# Patient Record
Sex: FEMALE | Race: OTHER | HISPANIC OR LATINO | URBAN - METROPOLITAN AREA
[De-identification: names, ages, dates, MRNs, and addresses within clinical notes are randomized per-mention and may not be internally consistent; named-entity substitution may affect disease eponyms.]

---

## 2018-10-19 ENCOUNTER — EMERGENCY (EMERGENCY)
Facility: HOSPITAL | Age: 68
LOS: 1 days | Discharge: HOME | End: 2018-10-19
Attending: EMERGENCY MEDICINE
Payer: MEDICAID

## 2018-10-19 VITALS
DIASTOLIC BLOOD PRESSURE: 78 MMHG | RESPIRATION RATE: 16 BRPM | WEIGHT: 110.01 LBS | OXYGEN SATURATION: 99 % | HEART RATE: 88 BPM | HEIGHT: 61 IN | SYSTOLIC BLOOD PRESSURE: 165 MMHG | TEMPERATURE: 99 F

## 2018-10-19 PROCEDURE — 99284 EMERGENCY DEPT VISIT MOD MDM: CPT | Mod: 25

## 2018-10-19 PROCEDURE — 12001 RPR S/N/AX/GEN/TRNK 2.5CM/<: CPT

## 2018-10-19 PROCEDURE — 90715 TDAP VACCINE 7 YRS/> IM: CPT

## 2018-10-19 PROCEDURE — 90471 IMMUNIZATION ADMIN: CPT

## 2018-10-19 PROCEDURE — 99283 EMERGENCY DEPT VISIT LOW MDM: CPT

## 2018-10-19 RX ORDER — IBUPROFEN 200 MG
400 TABLET ORAL ONCE
Qty: 0 | Refills: 0 | Status: COMPLETED | OUTPATIENT
Start: 2018-10-19 | End: 2018-10-19

## 2018-10-19 RX ORDER — TETANUS TOXOID, REDUCED DIPHTHERIA TOXOID AND ACELLULAR PERTUSSIS VACCINE, ADSORBED 5; 2.5; 8; 8; 2.5 [IU]/.5ML; [IU]/.5ML; UG/.5ML; UG/.5ML; UG/.5ML
0.5 SUSPENSION INTRAMUSCULAR ONCE
Qty: 0 | Refills: 0 | Status: COMPLETED | OUTPATIENT
Start: 2018-10-19 | End: 2018-10-19

## 2018-10-19 RX ORDER — ACETAMINOPHEN 500 MG
650 TABLET ORAL ONCE
Qty: 0 | Refills: 0 | Status: COMPLETED | OUTPATIENT
Start: 2018-10-19 | End: 2018-10-19

## 2018-10-19 RX ORDER — CEPHALEXIN 500 MG
1 CAPSULE ORAL
Qty: 14 | Refills: 0 | OUTPATIENT
Start: 2018-10-19 | End: 2018-10-25

## 2018-10-19 RX ADMIN — TETANUS TOXOID, REDUCED DIPHTHERIA TOXOID AND ACELLULAR PERTUSSIS VACCINE, ADSORBED 0.5 MILLILITER(S): 5; 2.5; 8; 8; 2.5 SUSPENSION INTRAMUSCULAR at 13:28

## 2018-10-19 NOTE — ED PROVIDER NOTE - PROGRESS NOTE DETAILS
Spoke with Dr. Arredondo (hand) will suture laceration, and have patient see Dr. Arredondo in the office next week. for follow up and schedule of surgery. Patient sutured by ortho, will follow up with Dr. Arredondo, Pt is well appearing walking with steady gait, stable for discharge and follow up without fail with medical doctor. I had a detailed discussion with the patient and/or guardian regarding the historical points, exam findings, and any diagnostic results supporting the discharge diagnosis. Pt educated on care and need for follow up. Strict return instructions and red flag signs and symptoms discussed with patient. Questions answered. Pt shows understanding of discharge information and agrees to follow.

## 2018-10-19 NOTE — ED ADULT NURSE NOTE - OBJECTIVE STATEMENT
States she cut her right hand with a knife today. right 5th finger with laceration, ,unable to move right 5th finger. States she cut her right hand with a knife today. right 5th finger with laceration, ,unable to move right 5th finger.Right 5th finger laceration sutures by orthopedic PA.Refused repeat VS.

## 2018-10-19 NOTE — ED PROVIDER NOTE - PRINCIPAL DIAGNOSIS
Laceration of little finger without foreign body with damage to nail, unspecified laterality, initial encounter

## 2018-10-19 NOTE — ED PROVIDER NOTE - CARE PLAN
Principal Discharge DX:	Laceration of little finger without foreign body with damage to nail, unspecified laterality, initial encounter

## 2018-10-19 NOTE — ED PROVIDER NOTE - OBJECTIVE STATEMENT
66 y/o F patient with a significant PMHx of Arthritis and no significant PSHx presents to the ED with a right fifth digit laceration. Patient denies any other complaints. NKDA. 66 y/o F patient with a significant PMHx of Arthritis and no significant PSHx presents to the ED with a right fifth digit laceration. Patient denies any other complaints.  NKDA.

## 2018-10-19 NOTE — ED PROVIDER NOTE - CONSTITUTIONAL, MLM
normal... Well appearing, well nourished, awake, alert, oriented to person, place, time/situation and in no apparent distress. Well appearing, well nourished, awake, alert, oriented to person, place, time/situation and in no apparent  distress.

## 2018-10-19 NOTE — ED PROVIDER NOTE - MEDICAL DECISION MAKING DETAILS
Patient with laceration to the left 5th proximal phalanx, Tdap, orthopedic consult for laceration repair, likely tendon repair. Patient with  laceration to the left 5th proximal phalanx, Tdap, orthopedic consult for laceration repair, likely tendon  repair.

## 2018-10-19 NOTE — CONSULT NOTE ADULT - SUBJECTIVE AND OBJECTIVE BOX
Pt Name: KAREN LEE  MRN: [id number]    ORTHOPEDIC CONSULT:    Orthopedic diagnosis:    HPI: Patient is a 67y Female with right hand 5th digit laceration. [employer] [facility] #[id number] used. Patient states she was at home when she cutting food and cut her right hand 5th digit. She states she had went to the ED after because it was bleeding a lot. Patient states she had some numbness which had went away by the time she came to hospital.       PAST MEDICAL & SURGICAL HISTORY:      ALLERGIES: No Known Allergies      MEDICATIONS: acetaminophen   Tablet .. 650 milliGRAM(s) Oral once  diphtheria/tetanus/pertussis (acellular) Vaccine (ADAcel) 0.5 milliLiter(s) IntraMuscular once  ibuprofen  Tablet. 400 milliGRAM(s) Oral Once      PHYSICAL EXAM:    Vital Signs Last 24 Hrs  T(C): 37.1 (19 Oct 2018 10:46), Max: 37.1 (19 Oct 2018 10:46)  T(F): 98.7 (19 Oct 2018 10:46), Max: 98.7 (19 Oct 2018 10:46)  HR: 88 (19 Oct 2018 10:46) (88 - 88)  BP: 165/78 (19 Oct 2018 10:46) (165/78 - 165/78)  BP(mean): --  RR: 16 (19 Oct 2018 10:46) (16 - 16)  SpO2: 99% (19 Oct 2018 10:46) (99% - 99%)    Left Hand: Laceration at left hand 5th digit on flexor side of PIP. Blood on gauze and at laceration. No foreign body seen. Pain at site. Sensation proximal to laceration. Decreased sensation distal to laceration. <2secs capillary refill at 5th digit        RADIOLOGY:     IMPRESSION: Pt is a  67y Female with Left Hand 5th digit laceration    PLAN:  -  Pain management  -  Laceration/wound irrigated using sterile technique with normal saline and betadine solution  -  Laceration sutured using 3-0 nylon with sterile technique  -  Dressing applied to laceration  -  Patient to follow up with Dr. Arredondo within 1 week for suture removal and wound check  -  Case discussed with Dr. Arredondo

## 2018-10-26 ENCOUNTER — EMERGENCY (EMERGENCY)
Facility: HOSPITAL | Age: 68
LOS: 1 days | Discharge: HOME | End: 2018-10-26
Attending: STUDENT IN AN ORGANIZED HEALTH CARE EDUCATION/TRAINING PROGRAM
Payer: SELF-PAY

## 2018-10-26 VITALS
SYSTOLIC BLOOD PRESSURE: 133 MMHG | OXYGEN SATURATION: 98 % | RESPIRATION RATE: 17 BRPM | TEMPERATURE: 98 F | DIASTOLIC BLOOD PRESSURE: 79 MMHG

## 2018-10-26 VITALS — HEIGHT: 59.06 IN | WEIGHT: 151.9 LBS

## 2018-10-26 PROCEDURE — G0463: CPT

## 2018-10-26 NOTE — ED PROVIDER NOTE - ATTENDING CONTRIBUTION TO CARE
Patient seen by me primarily. I agree with np assessment.  Patient presenting with laceration s/p suture repair repaired by ortho due to suspicion of tendon injury.   patient exam well healed, no erythema or warmth. unable to range digit,   information given for hand eval.

## 2018-10-26 NOTE — ED PROVIDER NOTE - PROGRESS NOTE DETAILS
Sutures removed. No dehiscence. No infection. Discussed with patient about suspicion of flexor tendon injury. Patient will need to follow up with the hand surgeon. Pt is well appearing walking with steady gait, stable for discharge and follow up without fail with medical doctor. I had a detailed discussion with the patient and/or guardian regarding the historical points, exam findings, and any diagnostic results supporting the discharge diagnosis. Pt educated on care and need for follow up. Strict return instructions and red flag signs and symptoms discussed with patient. Questions answered. Pt shows understanding of discharge information and agrees to follow.

## 2018-10-26 NOTE — ED PROVIDER NOTE - OBJECTIVE STATEMENT
ID# [id number]: 67 year-old female, presents for suture removal s/p lac repair 1 week ago. Reports that was cutting with kitchen knife and accidentally cut R pinky finger. Originally ortho was consulted and lac repaired with referral to hand surgery due to suspicion of tendon injury  ID# [id number]: 67 year-old female, presents for suture removal s/p lac repair 1 week ago. Reports that was cutting with kitchen knife and accidentally cut R pinky finger. Originally ortho was consulted and lac repaired with referral to hand surgery due to suspicion of tendon injury. Patient was unable to follow up with the specialist because doesn't have insurance. Unable to flex pinky finger. Reports no fever, chills, swelling, redness, drainage or any other complaints.

## 2018-10-26 NOTE — ED PROVIDER NOTE - PHYSICAL EXAMINATION
R hand 5th digit 6 sutures over PIP (palmar). Unable to flex finger at the PIP/DIP. NO localized tenderness to palpation. No erythema, induration or streaking. Cap. refill < 2 sec.

## 2018-10-29 PROBLEM — M19.90 UNSPECIFIED OSTEOARTHRITIS, UNSPECIFIED SITE: Chronic | Status: INACTIVE | Noted: 2018-10-19 | Resolved: 2018-10-28
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
## 2018-10-30 DIAGNOSIS — Z48.02 ENCOUNTER FOR REMOVAL OF SUTURES: ICD-10-CM

## 2018-10-30 DIAGNOSIS — S61.216D LACERATION WITHOUT FOREIGN BODY OF RIGHT LITTLE FINGER WITHOUT DAMAGE TO NAIL, SUBSEQUENT ENCOUNTER: ICD-10-CM
